# Patient Record
Sex: MALE | Race: WHITE | NOT HISPANIC OR LATINO | URBAN - METROPOLITAN AREA
[De-identification: names, ages, dates, MRNs, and addresses within clinical notes are randomized per-mention and may not be internally consistent; named-entity substitution may affect disease eponyms.]

---

## 2017-07-19 ENCOUNTER — TELEPHONE (OUTPATIENT)
Dept: NEUROLOGY | Facility: CLINIC | Age: 62
End: 2017-07-19

## 2017-07-19 NOTE — TELEPHONE ENCOUNTER
Received referral for muscle biopsy but was then told to disregard this referral, as patient completed biopsy elsewhere.     In order to be seen by a neuromuscular specialist here, we will need to review the muscle biopsy results. If the results are negative, then neuromuscular specialist would not be appropriate.     If the patient was taking steroids before the biopsy, we would need to know this as well, as results in such a case might be misleading.    Will disregard this referral for now, and ensure receipt of the above should patient wish to schedule neurology appointment here.